# Patient Record
Sex: MALE | Race: WHITE | NOT HISPANIC OR LATINO | Employment: UNEMPLOYED | URBAN - METROPOLITAN AREA
[De-identification: names, ages, dates, MRNs, and addresses within clinical notes are randomized per-mention and may not be internally consistent; named-entity substitution may affect disease eponyms.]

---

## 2017-03-08 ENCOUNTER — TRANSCRIBE ORDERS (OUTPATIENT)
Dept: DIABETES SERVICES | Facility: HOSPITAL | Age: 62
End: 2017-03-08

## 2017-03-08 DIAGNOSIS — IMO0002 UNCONTROLLED TYPE 2 DIABETES MELLITUS WITH OTHER SPECIFIED COMPLICATION, WITHOUT LONG-TERM CURRENT USE OF INSULIN: Primary | ICD-10-CM

## 2017-03-14 ENCOUNTER — OFFICE VISIT (OUTPATIENT)
Dept: DIABETES SERVICES | Facility: HOSPITAL | Age: 62
End: 2017-03-14

## 2017-03-14 PROCEDURE — G0109 DIAB MANAGE TRN IND/GROUP: HCPCS | Performed by: DIETITIAN, REGISTERED

## 2017-04-05 ENCOUNTER — APPOINTMENT (OUTPATIENT)
Dept: DIABETES SERVICES | Facility: HOSPITAL | Age: 62
End: 2017-04-05

## 2017-10-20 ENCOUNTER — TRANSCRIBE ORDERS (OUTPATIENT)
Dept: ADMINISTRATIVE | Facility: HOSPITAL | Age: 62
End: 2017-10-20

## 2017-10-20 DIAGNOSIS — Z87.891 PERSONAL HISTORY OF TOBACCO USE, PRESENTING HAZARDS TO HEALTH: Primary | ICD-10-CM

## 2017-10-31 ENCOUNTER — HOSPITAL ENCOUNTER (OUTPATIENT)
Dept: CT IMAGING | Facility: HOSPITAL | Age: 62
Discharge: HOME | End: 2017-10-31
Attending: FAMILY MEDICINE | Admitting: FAMILY MEDICINE

## 2017-10-31 DIAGNOSIS — Z87.891 PERSONAL HISTORY OF TOBACCO USE, PRESENTING HAZARDS TO HEALTH: ICD-10-CM

## 2017-10-31 PROCEDURE — G0297 LDCT FOR LUNG CA SCREEN: HCPCS

## 2017-11-06 PROBLEM — I81 PORTAL VEIN THROMBOSIS: Status: ACTIVE | Noted: 2017-11-06

## 2017-11-07 ENCOUNTER — CONSULT (OUTPATIENT)
Dept: ONCOLOGY | Facility: CLINIC | Age: 62
End: 2017-11-07

## 2017-11-07 ENCOUNTER — LAB (OUTPATIENT)
Dept: LAB | Facility: HOSPITAL | Age: 62
End: 2017-11-07

## 2017-11-07 VITALS
HEIGHT: 68 IN | BODY MASS INDEX: 27.28 KG/M2 | TEMPERATURE: 98.1 F | RESPIRATION RATE: 20 BRPM | SYSTOLIC BLOOD PRESSURE: 142 MMHG | WEIGHT: 180 LBS | DIASTOLIC BLOOD PRESSURE: 71 MMHG | HEART RATE: 77 BPM

## 2017-11-07 DIAGNOSIS — I81 PORTAL VEIN THROMBOSIS: Primary | ICD-10-CM

## 2017-11-07 DIAGNOSIS — I81 PORTAL VEIN THROMBOSIS: ICD-10-CM

## 2017-11-07 LAB
ALBUMIN SERPL-MCNC: 4.2 G/DL (ref 3.2–4.8)
ALBUMIN/GLOB SERPL: 1.3 G/DL (ref 1.5–2.5)
ALP SERPL-CCNC: 90 U/L (ref 25–100)
ALT SERPL W P-5'-P-CCNC: 17 U/L (ref 7–40)
ANION GAP SERPL CALCULATED.3IONS-SCNC: 5 MMOL/L (ref 3–11)
AST SERPL-CCNC: 31 U/L (ref 0–33)
BILIRUB SERPL-MCNC: 0.9 MG/DL (ref 0.3–1.2)
BUN BLD-MCNC: 6 MG/DL (ref 9–23)
BUN/CREAT SERPL: 7.5 (ref 7–25)
CALCIUM SPEC-SCNC: 9.4 MG/DL (ref 8.7–10.4)
CHLORIDE SERPL-SCNC: 98 MMOL/L (ref 99–109)
CO2 SERPL-SCNC: 26 MMOL/L (ref 20–31)
CREAT BLD-MCNC: 0.8 MG/DL (ref 0.6–1.3)
ERYTHROCYTE [DISTWIDTH] IN BLOOD BY AUTOMATED COUNT: 13.3 % (ref 11.3–14.5)
GFR SERPL CREATININE-BSD FRML MDRD: 99 ML/MIN/1.73
GLOBULIN UR ELPH-MCNC: 3.2 GM/DL
GLUCOSE BLD-MCNC: 131 MG/DL (ref 70–100)
HCT VFR BLD AUTO: 47.3 % (ref 38.9–50.9)
HGB BLD-MCNC: 15.8 G/DL (ref 13.1–17.5)
LYMPHOCYTES # BLD AUTO: 1.7 10*3/MM3 (ref 0.6–4.8)
LYMPHOCYTES NFR BLD AUTO: 30.1 % (ref 24–44)
MCH RBC QN AUTO: 35 PG (ref 27–31)
MCHC RBC AUTO-ENTMCNC: 33.3 G/DL (ref 32–36)
MCV RBC AUTO: 105 FL (ref 80–99)
MONOCYTES # BLD AUTO: 0.5 10*3/MM3 (ref 0–1)
MONOCYTES NFR BLD AUTO: 8 % (ref 0–12)
NEUTROPHILS # BLD AUTO: 3.6 10*3/MM3 (ref 1.5–8.3)
NEUTROPHILS NFR BLD AUTO: 61.9 % (ref 41–71)
PLATELET # BLD AUTO: 217 10*3/MM3 (ref 150–450)
PMV BLD AUTO: 7.1 FL (ref 6–12)
POTASSIUM BLD-SCNC: 4.9 MMOL/L (ref 3.5–5.5)
PROT SERPL-MCNC: 7.4 G/DL (ref 5.7–8.2)
RBC # BLD AUTO: 4.5 10*6/MM3 (ref 4.2–5.76)
SODIUM BLD-SCNC: 129 MMOL/L (ref 132–146)
WBC NRBC COR # BLD: 5.8 10*3/MM3 (ref 3.5–10.8)

## 2017-11-07 PROCEDURE — 81240 F2 GENE: CPT | Performed by: INTERNAL MEDICINE

## 2017-11-07 PROCEDURE — 81241 F5 GENE: CPT | Performed by: INTERNAL MEDICINE

## 2017-11-07 PROCEDURE — 99245 OFF/OP CONSLTJ NEW/EST HI 55: CPT | Performed by: INTERNAL MEDICINE

## 2017-11-07 PROCEDURE — 88187 FLOWCYTOMETRY/READ 2-8: CPT | Performed by: INTERNAL MEDICINE

## 2017-11-07 PROCEDURE — 88185 FLOWCYTOMETRY/TC ADD-ON: CPT | Performed by: INTERNAL MEDICINE

## 2017-11-07 PROCEDURE — 85025 COMPLETE CBC W/AUTO DIFF WBC: CPT

## 2017-11-07 PROCEDURE — 85300 ANTITHROMBIN III ACTIVITY: CPT | Performed by: INTERNAL MEDICINE

## 2017-11-07 PROCEDURE — 88184 FLOWCYTOMETRY/ TC 1 MARKER: CPT | Performed by: INTERNAL MEDICINE

## 2017-11-07 PROCEDURE — 36415 COLL VENOUS BLD VENIPUNCTURE: CPT

## 2017-11-07 PROCEDURE — 80053 COMPREHEN METABOLIC PANEL: CPT | Performed by: INTERNAL MEDICINE

## 2017-11-07 RX ORDER — POTASSIUM CHLORIDE 20 MEQ/1
TABLET, EXTENDED RELEASE ORAL
Refills: 0 | COMMUNITY
Start: 2017-09-14

## 2017-11-07 RX ORDER — ASPIRIN 325 MG/1
TABLET, FILM COATED ORAL
Refills: 0 | COMMUNITY
Start: 2017-08-18

## 2017-11-07 RX ORDER — WARFARIN SODIUM 4 MG/1
TABLET ORAL
Refills: 0 | COMMUNITY
Start: 2017-09-09

## 2017-11-07 RX ORDER — NADOLOL 20 MG/1
TABLET ORAL
Refills: 0 | COMMUNITY
Start: 2017-08-17

## 2017-11-07 RX ORDER — WARFARIN SODIUM 2.5 MG/1
TABLET ORAL
Refills: 0 | COMMUNITY
Start: 2017-10-25

## 2017-11-07 RX ORDER — OXYCODONE HYDROCHLORIDE 5 MG/1
TABLET ORAL
Refills: 0 | COMMUNITY
Start: 2017-10-17

## 2017-11-07 RX ORDER — SPIRONOLACTONE 50 MG/1
TABLET, FILM COATED ORAL
Refills: 1 | COMMUNITY
Start: 2017-10-17

## 2017-11-07 RX ORDER — GABAPENTIN 300 MG/1
CAPSULE ORAL
Refills: 1 | COMMUNITY
Start: 2017-10-18

## 2017-11-07 RX ORDER — FOLIC ACID 1 MG/1
TABLET ORAL
Refills: 0 | COMMUNITY
Start: 2017-10-25

## 2017-11-07 RX ORDER — PANTOPRAZOLE SODIUM 40 MG/1
TABLET, DELAYED RELEASE ORAL
Refills: 0 | COMMUNITY
Start: 2017-09-19

## 2017-11-07 RX ORDER — FUROSEMIDE 40 MG/1
TABLET ORAL
Refills: 0 | COMMUNITY
Start: 2017-10-25

## 2017-11-07 RX ORDER — CIPROFLOXACIN 500 MG/1
TABLET, FILM COATED ORAL
Refills: 0 | COMMUNITY
Start: 2017-10-25

## 2017-11-07 RX ORDER — SPIRONOLACTONE 25 MG/1
TABLET ORAL
Refills: 0 | COMMUNITY
Start: 2017-09-19 | End: 2017-12-14 | Stop reason: DRUGHIGH

## 2017-11-07 NOTE — PROGRESS NOTES
CHIEF COMPLAINT: Portal vein thrombosis    REASON FOR REFERRAL: Same      RECORDS OBTAINED  Records of the patients history including those obtained from [facility] were reviewed and summarized in detail.    HISTORY OF PRESENT ILLNESS:  The patient is a 62 y.o.  male, referred for portal vein thrombosis.  He presented to his local hospital with abdominal pain in January and was found to have portal vein thrombosis and sent to .  He's been followed with the hepatologists there since that time.  On Coumadin, subsequent scanning this past summer showed diminution of clot but still with significant hepato-portal vein flow reversal and cirrhosis.  He has a history of alcohol abuse but completely went sober as of January 2017.  He's been on Coumadin since that time and comes to me for discussion of duration of anticoagulation.    REVIEW OF SYSTEMS:  A 14 point review of systems was performed and is negative except as noted above.    Past Medical History:   Diagnosis Date   • Arthritis    • Asthma    • Diabetes mellitus    • DVT (deep venous thrombosis) 10/2017   • Liver disease      Past Surgical History:   Procedure Laterality Date   • BACK SURGERY  1997   • HERNIA REPAIR     • THORACOT[facility]Y Left [facility]    [facility]   • TONSILLECTOMY     • WISDOM TOOTH EXTRACTION  2001    Dr. Mendez       No current outpatient prescriptions on file prior to visit.     No current facility-administered medications on file prior to visit.        Allergies   Allergen Reactions   • Codeine Itching and Rash   • Hydrocodone Itching and Rash       Social History     Social History   • Marital status: Single     Spouse name: N/A   • Number of children: N/A   • Years of education: N/A     Social History Main Topics   • Smoking status: Current Every Day Smoker     Packs/day: 1.00     Years: 40.00     Types: Cigarettes   • Smokeless tobacco: Never Used   • Alcohol use None   • Drug use: None   • Sexual activity: Not Asked     Other Topics Concern  "  • None     Social History Narrative   • None       Family History   Problem Relation Age of Onset   • Throat cancer Paternal Grandmother        PHYSICAL EXAM:    /71  Pulse 77  Temp 98.1 °F (36.7 °C)  Resp 20  Ht 68\" (172.7 cm)  Wt 180 lb (81.6 kg)  BMI 27.37 kg/m2    ECOG: (2) Ambulatory and capable of self care, unable to carry out work activity, up and about > 50% or waking hours  General: well appearing male in no acute distress  HEENT: sclera anicteric, oropharynx clear  Lymphatics: no cervical, supraclavicular, inguinal, or axillary adenopathy  Cardiovascular: regular rate and rhythm, no murmurs  Neck: Supple; No thyromegaly  Lungs: clear to auscultation bilaterally. No respiratory distress.   Abdomen: soft, nontender, nondistended.  No palpable organomegaly  Extremities: no cyanosis, clubbing, edema, or cords  Skin: no rashes, lesions, bruising, or petechiae  Neuro: Alert and oriented x 4; Moving all extremities.  Psych: No anxiety or depression    Lab on 11/07/2017   Component Date Value Ref Range Status   • WBC 11/07/2017 5.80  3.50 - 10.80 10*3/mm3 Final   • RBC 11/07/2017 4.50  4.20 - 5.76 10*6/mm3 Final   • Hemoglobin 11/07/2017 15.8  13.1 - 17.5 g/dL Final   • Hematocrit 11/07/2017 47.3  38.9 - 50.9 % Final   • RDW 11/07/2017 13.3  11.3 - 14.5 % Final   • MCV 11/07/2017 105.0* 80.0 - 99.0 fL Final   • MCH 11/07/2017 35.0* 27.0 - 31.0 pg Final   • MCHC 11/07/2017 33.3  32.0 - 36.0 g/dL Final   • MPV 11/07/2017 7.1  6.0 - 12.0 fL Final   • Platelets 11/07/2017 217  150 - 450 10*3/mm3 Final   • Neutrophil % 11/07/2017 61.9  41.0 - 71.0 % Final   • Lymphocyte % 11/07/2017 30.1  24.0 - 44.0 % Final   • Monocyte % 11/07/2017 8.0  0.0 - 12.0 % Final   • Neutrophils, Absolute 11/07/2017 3.60  1.50 - 8.30 10*3/mm3 Final   • Lymphocytes, Absolute 11/07/2017 1.70  0.60 - 4.80 10*3/mm3 Final   • Monocytes, Absolute 11/07/2017 0.50  0.00 - 1.00 10*3/mm3 Final       Assessment/Plan     1. Compatible " portal vein thrombosis  2. Alcoholic cirrhosis    Discussion: in a meta-analysis of 353 patient's with cirrhosis and portal vein thrombosis, recanalization was 71% versus 42% comparing those on anticoagulation versus those without.  Whether that means he needs to stay on lifetime anticoagulation is a more difficult decision and I would defer ultimately to our hepatology colleagues.  It is basically a balance between the risk of bleeding from varices versus the risk of worsening his varices by allowing of recurrent thrombosis.  He has quit drinking but he obviously has enough hepatic injury to make him at risk for thrombosis and, given that he was told that he had grade 1 varices which are not, I would tend to just keep him on Coumadin for life.  I will do a hypercoagulable evaluation but I cannot do a thorough 1 as long as he is on Coumadin.  He needs a repeat EGD and colonoscopy both to assess for his current degree of varices as well as to do cancer screening for which he is 10 years out now from his last colonoscopy.  I asked for him to discuss this with his hepatology colleagues at  in December and for them to arrange for this to be done at .  When I see him back in December and go over the initial part of his hypercoagulable workup, I will arrange for his protein C, protein S, and lupus anticoagulant to be done when we place him on a Lovenox bridge around the time of his endoscopy.  The rest of the workup we can do while on Coumadin.  The newer direct thrombin inhibitors have a lower risk of bleeding but no antidote and therefore I would continue his current therapy.  Dr. Mercedes is monitoring the pro times.      Giorgio Huizar MD    11/7/2017

## 2017-11-08 LAB
CARDIOLIPIN IGG SER IA-ACNC: <9 GPL U/ML (ref 0–14)
CARDIOLIPIN IGM SER IA-ACNC: 42 MPL U/ML (ref 0–12)

## 2017-11-09 LAB
AT III PPP CHRO-ACNC: 85 % (ref 75–135)
B2 GLYCOPROT1 IGA SER-ACNC: <9 GPI IGA UNITS (ref 0–25)
B2 GLYCOPROT1 IGG SER-ACNC: <9 GPI IGG UNITS (ref 0–20)
B2 GLYCOPROT1 IGM SER-ACNC: 36 GPI IGM UNITS (ref 0–32)

## 2017-11-10 LAB
ANTIBODIES PERFORMED:: NORMAL
CD59 DEFICIENT GRANULOCYTES NFR BLD: NORMAL %
CONV COMMENT: NORMAL
CONV LAB AP FLOW CELL POPULATION DATA: NORMAL
INTERPRETATION: NORMAL
LAB DIRECTOR NAME PROVIDER: NORMAL
Lab: NORMAL
Lab: NORMAL
MONOCYTES NFR BLD AUTO: NORMAL %
PATH REPORT.COMMENTS IMP SPEC: NORMAL
VIABLE CELLS NFR SPEC: NORMAL %

## 2017-11-13 LAB
F2 GENE MUT ANL BLD/T: NORMAL
F5 GENE MUT ANL BLD/T: NORMAL
FACTOR V COMMENT: NORMAL
Lab: NORMAL

## 2017-12-14 ENCOUNTER — OFFICE VISIT (OUTPATIENT)
Dept: ONCOLOGY | Facility: CLINIC | Age: 62
End: 2017-12-14

## 2017-12-14 VITALS
RESPIRATION RATE: 20 BRPM | WEIGHT: 183 LBS | TEMPERATURE: 97.1 F | HEART RATE: 69 BPM | DIASTOLIC BLOOD PRESSURE: 74 MMHG | HEIGHT: 68 IN | BODY MASS INDEX: 27.74 KG/M2 | SYSTOLIC BLOOD PRESSURE: 127 MMHG

## 2017-12-14 DIAGNOSIS — I81 PORTAL VEIN THROMBOSIS: Primary | ICD-10-CM

## 2017-12-14 PROCEDURE — 99213 OFFICE O/P EST LOW 20 MIN: CPT | Performed by: INTERNAL MEDICINE

## 2017-12-14 RX ORDER — LANCETS 33 GAUGE
EACH MISCELLANEOUS
COMMUNITY
Start: 2017-10-20

## 2017-12-14 RX ORDER — GLIPIZIDE 2.5 MG/1
TABLET, EXTENDED RELEASE ORAL
Refills: 0 | COMMUNITY
Start: 2017-11-13

## 2017-12-14 RX ORDER — POLYETHYLENE GLYCOL-3350 AND ELECTROLYTES 236; 6.74; 5.86; 2.97; 22.74 G/274.31G; G/274.31G; G/274.31G; G/274.31G; G/274.31G
POWDER, FOR SOLUTION ORAL
Refills: 0 | COMMUNITY
Start: 2017-12-05

## 2017-12-14 NOTE — PROGRESS NOTES
CHIEF COMPLAINT: Follow-up portal vein thrombosis with history of alcoholic cirrhosis      HISTORY OF PRESENT ILLNESS:  The patient is a 62 y.o. male, here for follow up on management of portal vein thrombosis.  He presented to his local hospital with abdominal pain in January and was found to have portal vein thrombosis and sent to .  He's been followed with the hepatologists there since that time.  On Coumadin, subsequent scanning this past summer showed diminution of clot but still with significant hepato-portal vein flow reversal and cirrhosis.  He has a history of alcohol abuse but completely went sober as of January 2017.  A hypercoagulable panel.  PNH panel was negative.  CBC was normal.  Beta 2 glycoprotein 1 IgM slightly elevated at 36 upper limit of normal 32.  Had a low-medium positive IgM anti-cardiolipin antibody of 42.  Antithrombin III negative.  Factor V Leiden mutation negative.  Prothrombin gene mutation negative.  Has come off of Coumadin without any further portal vein thrombosis and  hepatology has said his portal vein thrombosis has resolved clinically and they want him off of Coumadin.  He is due for colonoscopy.      Past Medical History:   Diagnosis Date   • Arthritis    • Asthma    • Diabetes mellitus    • DVT (deep venous thrombosis) 10/2017   • Liver disease      Past Surgical History:   Procedure Laterality Date   • BACK SURGERY  1997   • HERNIA REPAIR     • THORACOTOMY Left 1981    [facility]   • TONSILLECTOMY     • WISDOM TOOTH EXTRACTION  2001    Dr. Mendez       Allergies   Allergen Reactions   • Codeine Itching and Rash   • Hydrocodone Itching and Rash       Family History and Social History reviewed and changed as necessary      REVIEW OF SYSTEM:   Review of Systems   Constitutional: Negative for appetite change, chills, diaphoresis, fatigue, fever and unexpected weight change.   HENT:   Negative for mouth sores, sore throat and trouble swallowing.    Eyes: Negative for  "icterus.   Respiratory: Negative for cough, hemoptysis and shortness of breath.    Cardiovascular: Negative for chest pain, leg swelling and palpitations.   Gastrointestinal: Negative for abdominal distention, abdominal pain, blood in stool, constipation, diarrhea, nausea and vomiting.   Endocrine: Negative for hot flashes.   Genitourinary: Negative for bladder incontinence, difficulty urinating, dysuria, frequency and hematuria.    Musculoskeletal: Negative for gait problem, neck pain and neck stiffness.   Skin: Negative for rash.   Neurological: Negative for dizziness, gait problem, headaches, light-headedness and numbness.   Hematological: Negative for adenopathy. Does not bruise/bleed easily.   Psychiatric/Behavioral: Negative for depression. The patient is not nervous/anxious.    All other systems reviewed and are negative.       PHYSICAL EXAM    Vitals:    12/14/17 1532   BP: 127/74   Pulse: 69   Resp: 20   Temp: 97.1 °F (36.2 °C)   TempSrc: Temporal Artery    Weight: 83 kg (183 lb)   Height: 172.7 cm (68\")     Constitutional: Appears well-developed and well-nourished. No distress.   ECOG: (1) Restricted in physically strenuous activity, ambulatory and able to do work of light nature  HENT:   Head: Normocephalic.   Mouth/Throat: Oropharynx is clear and moist.   Eyes: Conjunctivae are normal. Pupils are equal, round, and reactive to light. No scleral icterus.   Neck: Neck supple. No JVD present. No thyromegaly present.   Cardiovascular: Normal rate, regular rhythm and normal heart sounds.    Pulmonary/Chest: Breath sounds normal. No respiratory distress.   Abdominal: Soft. Exhibits no distension and no mass. There is no hepatosplenomegaly. There is no tenderness. There is no rebound and no guarding.   Musculoskeletal:Exhibits no edema, tenderness or deformity.   Neurological: Alert and oriented to person, place, and time. Exhibits normal muscle tone.   Skin: No ecchymosis, no petechiae and no rash noted. Not " diaphoretic. No cyanosis. Nails show no clubbing.   Psychiatric: Normal mood and affect.   Vitals reviewed.      Lab on 11/07/2017   Component Date Value Ref Range Status   • Glucose 11/07/2017 131* 70 - 100 mg/dL Final   • BUN 11/07/2017 6* 9 - 23 mg/dL Final   • Creatinine 11/07/2017 0.80  0.60 - 1.30 mg/dL Final   • Sodium 11/07/2017 129* 132 - 146 mmol/L Final   • Potassium 11/07/2017 4.9  3.5 - 5.5 mmol/L Final   • Chloride 11/07/2017 98* 99 - 109 mmol/L Final   • CO2 11/07/2017 26.0  20.0 - 31.0 mmol/L Final   • Calcium 11/07/2017 9.4  8.7 - 10.4 mg/dL Final   • Total Protein 11/07/2017 7.4  5.7 - 8.2 g/dL Final   • Albumin 11/07/2017 4.20  3.20 - 4.80 g/dL Final   • ALT (SGPT) 11/07/2017 17  7 - 40 U/L Final   • AST (SGOT) 11/07/2017 31  0 - 33 U/L Final   • Alkaline Phosphatase 11/07/2017 90  25 - 100 U/L Final   • Total Bilirubin 11/07/2017 0.9  0.3 - 1.2 mg/dL Final   • eGFR Non African Amer 11/07/2017 99  >60 mL/min/1.73 Final   • Globulin 11/07/2017 3.2  gm/dL Final   • A/G Ratio 11/07/2017 1.3* 1.5 - 2.5 g/dL Final   • BUN/Creatinine Ratio 11/07/2017 7.5  7.0 - 25.0 Final   • Anion Gap 11/07/2017 5.0  3.0 - 11.0 mmol/L Final   • Beta-2 Glyco 1 IgG 11/07/2017 <9  0 - 20 GPI IgG units Final    The reference interval reflects a 3SD or 99th percentile interval,  which is thought to represent a potentially clinically significant  result in accordance with the International Consensus Statement on  the classification criteria for definitive antiphospholipid syndrome  (APS). J Thromb Haem 2006;4:295-306.   • Beta-2 Glyco 1 IgA 11/07/2017 <9  0 - 25 GPI IgA units Final    The reference interval reflects a 3SD or 99th percentile interval,  which is thought to represent a potentially clinically significant  result in accordance with the International Consensus Statement on  the classification criteria for definitive antiphospholipid syndrome  (APS). J Thromb Haem 2006;4:295-306.   • Beta-2 Glyco 1 IgM 11/07/2017  36* 0 - 32 GPI IgM units Final    The reference interval reflects a 3SD or 99th percentile interval,  which is thought to represent a potentially clinically significant  result in accordance with the International Consensus Statement on  the classification criteria for definitive antiphospholipid syndrome  (APS). J Thromb Haem 2006;4:295-306.   • Anticardiolipin IgG 11/07/2017 <9  0 - 14 GPL U/mL Final                              Negative:              <15                            Indeterminate:     15 - 20                            Low-Med Positive: >20 - 80                            High Positive:         >80   • Anticardiolipin IgM 11/07/2017 42* 0 - 12 MPL U/mL Final                              Negative:              <13                            Indeterminate:     13 - 20                            Low-Med Positive: >20 - 80                            High Positive:         >80   • AntiThromb III Func 11/07/2017 85  75 - 135 % Final    Direct thrombin inhibitor anticoagulants such as rivaroxaban,  apixaban and edoxaban will lead to spuriously elevated antithrombin  activity levels possibly masking a deficiency.   • Factor V Leiden 11/07/2017 Comment   Final    Comment: Result:  Negative (no mutation found)  Factor V Leiden is a specific mutation (R506Q) in the factor  V gene that is associated with an increased risk of venous  thrombosis. Factor V Leiden is more resistant to  inactivation by activated protein C.  As a result, factor V  persists in the circulation leading to a mild hyper-  coagulable state.  The Leiden mutation accounts for 90% -  95% of APC resistance.  Factor V Leiden has been reported in  patients with deep vein thrombosis, pulmonary embolus,  central retinal vein occlusion, cerebral sinus thrombosis  and hepatic vein thrombosis. Other risk factors to be  considered in the workup for venous thrombosis include the  [id number] mutation in the factor II (prothrombin) gene,  protein S and C  deficiency, and antithrombin deficiencies.  Anticardiolipin antibody and lupus anticoagulant analysis  may be appropriate for certain patients, as well as  homocysteine levels.  Contact your local [facility] for information on how to order  additional                            testing if desired.   • Factor V Comment 11/07/2017 Comment   Final    **Genetic counselors are available for health care providers to**    discuss results at [phone]).  Methodology:  DNA analysis of the Factor V gene was performed by allele-specific  PCR. The diagnostic sensitivity and specificity is >99% for both.  Molecular-based testing is highly accurate, but as in any laboratory  test, diagnostic errors may occur. All test results must be combined  with clinical information for the most accurate interpretation.  This test was developed and its performance characteristics determined  by [facility]. It has not been cleared or approved by the Food and Drug  Administration.  References:  John LOZADA (1996).  Clin Lab Med 16:169-186.  Myrna Diego, PhD, FAC  Lindsay Bah, PhD, FAC  Kitty Goetz, PhD, [facility]  Dayanna Mariscal M.S., PhD, FAC  Mercedes Pena, PhD, [facility]  Lucy Peck, PhD, [facility]  Shahab Pena PhD, [facility]   • Factor II, DNA Analysis 11/07/2017 Comment   Final    Comment: NEGATIVE  No mutation identified.  Comment:  A point mutation ([id number]) in the factor II (prothrombin) gene is the  second most common cause of inherited thrombophilia. The incidence of  this mutation in the [place]  population is about 2% and in the   population it is approximately 0.5%. This mutation is  rare in the  and  population. Being heterozygous  for a prothrombin mutation increases the risk for developing venous  thrombosis about 2 to 3 times above the general population risk. Being  homozygous for the prothrombin gene mutation increases the relative  risk for venous thrombosis  further, although it is not yet known how  much further the risk is increased. In women heterozygous for the  prothrombin gene mutation, the use of estrogen containing oral  contraceptives increases the relative risk of venous thrombosis about  16 times and the risk of developing cerebral thrombosis is also  significantly increased. In pregnancy the prothrombin                            gene mutation  increases risk for venous thrombosis and may increase risk for  stillbirth, placental abruption, pre-eclampsia and fetal growth  restriction. If the patient possesses two or more congenital or  acquired thrombophilic risk factors, the risk for thrombosis may rise  to more than the sum of the risk ratios for the individual mutations.  This assay detects only the prothrombin [id number] mutation and does  not measure genetic abnormalities elsewhere in the genome. Other  thrombotic risk factors may be pursued through systematic clinical  laboratory analysis. These factors include the R506Q (Leiden)  mutation in the Factor V gene, plasma homocysteine levels, as well  as testing for deficiencies of antithrombin III, protein C and  protein S.   • Additional Information 11/07/2017 Comment   Final    Comment: Genetic Counselors are available for health care providers  to discuss results at [phone]).  Methodology:  DNA analysis of the Factor II gene was performed by PCR  amplification followed by restriction analysis. The  diagnostic sensitivity is >99% for both. All the tests must  be combined with clinical information for the most accurate  interpretation. Molecular-based testing is highly accurate,  but as in any laboratory test, diagnostic errors may occur.  This test was developed and its performance characteristics  determined by [facility]. It has not been cleared or approved  by the Food and Drug Administration.  Poort SR, et al. Blood. 1996; 88:8432-4940.  Lola VALLEJO. Circulation. 2004; 110:e15-e18.  Jaime DOTSON,  et al. Arterioscler Thromb Vasc Biol. 1999;  19:700-703.  Myrna Diego, PhD, [facility]  Lindsay Bah, PhD, [facility]  Kitty Goetz, PhD, [facility]  Dayanna Mariscal M.S., PhD, [facility]  Mercedes Pena, PhD, [facility]  Lucy Peck, PhD, [facility]  Shahab Pena, PhD,                            [facility]   • Interpretation 11/07/2017 Comment   Final    Peripheral Blood: No evidence of paroxysmal nocturnal  hemoglobinuria (PNH).   • Comment 11/07/2017 Comment   Final    At the sensitivity level of this assay (typically 0.01-0.1%),  these results do not support a diagnosis of paroxysmal nocturnal  hemoglobinuria (PNH). Correlation with all available clinical,  laboratory, and morphologic data is recommended.  (sensitivity typically 0.01-0.1%; lower limit of detection  dependent on number of cells present and events acquired,  typically 90,000+ events acquired)   • Specimen(s) 11/07/2017 Peripheral Blood   Final   • Submitted Diagnosis 11/07/2017 Comment   Final    Evaluation for paroxysmal nocturnal hemoglobinuria (PNH)   • Viability 11/07/2017 98%   Final    (7AAD exclusion)   • Flow Cell Population Data 11/07/2017 Comment   Final                             Population Analysis   • Granulocytes: 11/07/2017 Comment   Final    No GPI-anchor deficiency   • Monocyte Rel % 11/07/2017 Comment   Final    No GPI-anchor deficiency   • Antibodies Performed: 11/07/2017 Comment   Final    CD14, CD15, CD24, CD45, CD64, FLAER   • Additional comment: 11/07/2017 Comment   Final    This test was developed and its performance characteristics determined  by [facility]. It has not been cleared or approved by the Food and Drug  Administration (FDA). The FDA has determined that such clearance or  approval is not necessary.  Any image(s) that accompany this report is/are a representative  image(s) only and should not be used to render a diagnosis.   • Director Review 11/07/2017 Comment   Final    Reviewed By: Shahab Quach M.D.   • WBC 11/07/2017 5.80   3.50 - 10.80 10*3/mm3 Final   • RBC 11/07/2017 4.50  4.20 - 5.76 10*6/mm3 Final   • Hemoglobin 11/07/2017 15.8  13.1 - 17.5 g/dL Final   • Hematocrit 11/07/2017 47.3  38.9 - 50.9 % Final   • RDW 11/07/2017 13.3  11.3 - 14.5 % Final   • MCV 11/07/2017 105.0* 80.0 - 99.0 fL Final   • MCH 11/07/2017 35.0* 27.0 - 31.0 pg Final   • MCHC 11/07/2017 33.3  32.0 - 36.0 g/dL Final   • MPV 11/07/2017 7.1  6.0 - 12.0 fL Final   • Platelets 11/07/2017 217  150 - 450 10*3/mm3 Final   • Neutrophil % 11/07/2017 61.9  41.0 - 71.0 % Final   • Lymphocyte % 11/07/2017 30.1  24.0 - 44.0 % Final   • Monocyte % 11/07/2017 8.0  0.0 - 12.0 % Final   • Neutrophils, Absolute 11/07/2017 3.60  1.50 - 8.30 10*3/mm3 Final   • Lymphocytes, Absolute 11/07/2017 1.70  0.60 - 4.80 10*3/mm3 Final   • Monocytes, Absolute 11/07/2017 0.50  0.00 - 1.00 10*3/mm3 Final       Assessment/Plan     1.  Portal vein thrombosis  2. Alcoholic cirrhosis    Discussion: Though we could check his protein C, protein S, lupus anticoagulant now that he is off of Coumadin, he is doing well off of Coumadin for the moment he will just follow-up with hepatology for repeat endoscopies and serial screenings for recurrent thrombosis but apparently he has been told that his portal vein thrombosis has subsided.  I will defer to their expertise on that.  If he has further recurrent vein thrombosis that I would want to get his protein C, protein S, lupus anticoagulant but would argue for the possibility of lifetime Coumadin regardless if this results had again as I suspect his underlying cirrhosis will be a procoagulant process.  That of course is weighed against the risk of variceal bleeding which still is significant though presently he is doing well.  I will see him on an as-needed basis  Giorgio Huizar MD    12/14/2017

## 2018-05-22 ENCOUNTER — TRANSCRIBE ORDERS (OUTPATIENT)
Dept: ADMINISTRATIVE | Facility: HOSPITAL | Age: 63
End: 2018-05-22

## 2018-05-22 ENCOUNTER — HOSPITAL ENCOUNTER (OUTPATIENT)
Dept: GENERAL RADIOLOGY | Facility: HOSPITAL | Age: 63
Discharge: HOME | End: 2018-05-22
Attending: FAMILY MEDICINE | Admitting: FAMILY MEDICINE

## 2018-05-22 DIAGNOSIS — M50.00 CERVICAL DISC DISEASE WITH MYELOPATHY: Primary | ICD-10-CM

## 2018-05-22 PROCEDURE — 72050 X-RAY EXAM NECK SPINE 4/5VWS: CPT

## 2018-11-08 ENCOUNTER — TRANSCRIBE ORDERS (OUTPATIENT)
Dept: ADMINISTRATIVE | Facility: HOSPITAL | Age: 63
End: 2018-11-08

## 2018-11-08 DIAGNOSIS — Z87.891 PERSONAL HISTORY OF TOBACCO USE, PRESENTING HAZARDS TO HEALTH: Primary | ICD-10-CM

## 2018-11-16 ENCOUNTER — HOSPITAL ENCOUNTER (OUTPATIENT)
Dept: CT IMAGING | Facility: HOSPITAL | Age: 63
Discharge: HOME | End: 2018-11-16
Attending: FAMILY MEDICINE | Admitting: FAMILY MEDICINE

## 2018-11-16 DIAGNOSIS — Z87.891 PERSONAL HISTORY OF TOBACCO USE, PRESENTING HAZARDS TO HEALTH: ICD-10-CM

## 2018-11-16 PROCEDURE — G0297 LDCT FOR LUNG CA SCREEN: HCPCS

## 2019-11-11 ENCOUNTER — TRANSCRIBE ORDERS (OUTPATIENT)
Dept: ADMINISTRATIVE | Facility: HOSPITAL | Age: 64
End: 2019-11-11

## 2019-11-11 DIAGNOSIS — Z87.891 PERSONAL HISTORY OF TOBACCO USE, PRESENTING HAZARDS TO HEALTH: Primary | ICD-10-CM

## 2019-12-05 ENCOUNTER — HOSPITAL ENCOUNTER (OUTPATIENT)
Dept: CT IMAGING | Facility: HOSPITAL | Age: 64
Discharge: HOME | End: 2019-12-05
Admitting: FAMILY MEDICINE

## 2019-12-05 DIAGNOSIS — Z87.891 PERSONAL HISTORY OF TOBACCO USE, PRESENTING HAZARDS TO HEALTH: ICD-10-CM

## 2019-12-05 PROCEDURE — G0297 LDCT FOR LUNG CA SCREEN: HCPCS

## 2021-05-26 ENCOUNTER — HOSPITAL ENCOUNTER (OUTPATIENT)
Dept: GENERAL RADIOLOGY | Facility: HOSPITAL | Age: 66
Discharge: HOME | End: 2021-05-26
Admitting: FAMILY MEDICINE

## 2021-05-26 ENCOUNTER — TRANSCRIBE ORDERS (OUTPATIENT)
Dept: ADMINISTRATIVE | Facility: HOSPITAL | Age: 66
End: 2021-05-26

## 2021-05-26 DIAGNOSIS — M25.512 ACUTE PAIN OF LEFT SHOULDER: ICD-10-CM

## 2021-05-26 DIAGNOSIS — M25.512 ACUTE PAIN OF LEFT SHOULDER: Primary | ICD-10-CM

## 2021-05-26 PROCEDURE — 73030 X-RAY EXAM OF SHOULDER: CPT

## 2021-05-26 PROCEDURE — 71100 X-RAY EXAM RIBS UNI 2 VIEWS: CPT

## 2021-05-28 ENCOUNTER — TRANSCRIBE ORDERS (OUTPATIENT)
Dept: ADMINISTRATIVE | Facility: HOSPITAL | Age: 66
End: 2021-05-28

## 2021-05-28 DIAGNOSIS — Z87.891 PERSONAL HISTORY OF TOBACCO USE, PRESENTING HAZARDS TO HEALTH: Primary | ICD-10-CM

## 2021-06-15 ENCOUNTER — HOSPITAL ENCOUNTER (OUTPATIENT)
Dept: CT IMAGING | Facility: HOSPITAL | Age: 66
Discharge: HOME | End: 2021-06-15
Admitting: FAMILY MEDICINE

## 2021-06-15 DIAGNOSIS — Z87.891 PERSONAL HISTORY OF TOBACCO USE, PRESENTING HAZARDS TO HEALTH: ICD-10-CM

## 2021-06-15 PROCEDURE — 71271 CT THORAX LUNG CANCER SCR C-: CPT

## 2024-01-15 ENCOUNTER — TRANSCRIBE ORDERS (OUTPATIENT)
Dept: ADMINISTRATIVE | Facility: HOSPITAL | Age: 69
End: 2024-01-15
Payer: COMMERCIAL

## 2024-01-15 DIAGNOSIS — Z87.891 PERSONAL HISTORY OF TOBACCO USE, PRESENTING HAZARDS TO HEALTH: Primary | ICD-10-CM

## 2024-02-02 ENCOUNTER — HOSPITAL ENCOUNTER (OUTPATIENT)
Dept: CT IMAGING | Facility: HOSPITAL | Age: 69
Discharge: HOME | End: 2024-02-02
Payer: MEDICARE

## 2024-02-02 DIAGNOSIS — Z87.891 PERSONAL HISTORY OF TOBACCO USE, PRESENTING HAZARDS TO HEALTH: ICD-10-CM

## 2024-02-02 PROCEDURE — 71271 CT THORAX LUNG CANCER SCR C-: CPT

## 2025-01-29 ENCOUNTER — TRANSCRIBE ORDERS (OUTPATIENT)
Dept: ADMINISTRATIVE | Facility: HOSPITAL | Age: 70
End: 2025-01-29
Payer: MEDICARE

## 2025-01-29 DIAGNOSIS — R10.31 RIGHT GROIN PAIN: Primary | ICD-10-CM

## 2025-02-03 ENCOUNTER — TRANSCRIBE ORDERS (OUTPATIENT)
Dept: ADMINISTRATIVE | Facility: HOSPITAL | Age: 70
End: 2025-02-03
Payer: MEDICARE

## 2025-02-03 DIAGNOSIS — Z87.891 PERSONAL HISTORY OF TOBACCO USE, PRESENTING HAZARDS TO HEALTH: Primary | ICD-10-CM

## 2025-02-28 ENCOUNTER — HOSPITAL ENCOUNTER (OUTPATIENT)
Facility: HOSPITAL | Age: 70
Discharge: HOME | End: 2025-02-28
Payer: MEDICARE

## 2025-02-28 DIAGNOSIS — Z87.891 PERSONAL HISTORY OF TOBACCO USE, PRESENTING HAZARDS TO HEALTH: ICD-10-CM

## 2025-02-28 PROCEDURE — 71271 CT THORAX LUNG CANCER SCR C-: CPT
